# Patient Record
Sex: FEMALE
[De-identification: names, ages, dates, MRNs, and addresses within clinical notes are randomized per-mention and may not be internally consistent; named-entity substitution may affect disease eponyms.]

---

## 2019-08-01 PROBLEM — Z00.00 ENCOUNTER FOR PREVENTIVE HEALTH EXAMINATION: Status: ACTIVE | Noted: 2019-08-01

## 2019-08-12 ENCOUNTER — APPOINTMENT (OUTPATIENT)
Dept: GYNECOLOGIC ONCOLOGY | Facility: CLINIC | Age: 54
End: 2019-08-12
Payer: COMMERCIAL

## 2019-08-12 VITALS
WEIGHT: 131.13 LBS | BODY MASS INDEX: 22.39 KG/M2 | DIASTOLIC BLOOD PRESSURE: 98 MMHG | HEIGHT: 64 IN | SYSTOLIC BLOOD PRESSURE: 138 MMHG

## 2019-08-12 DIAGNOSIS — Z72.89 OTHER PROBLEMS RELATED TO LIFESTYLE: ICD-10-CM

## 2019-08-12 DIAGNOSIS — Z82.3 FAMILY HISTORY OF STROKE: ICD-10-CM

## 2019-08-12 DIAGNOSIS — Z83.3 FAMILY HISTORY OF DIABETES MELLITUS: ICD-10-CM

## 2019-08-12 PROCEDURE — 99244 OFF/OP CNSLTJ NEW/EST MOD 40: CPT

## 2019-08-13 PROBLEM — Z83.3 FAMILY HISTORY OF DIABETES MELLITUS: Status: ACTIVE | Noted: 2019-08-13

## 2019-08-13 PROBLEM — Z72.89 ALCOHOL USE: Status: ACTIVE | Noted: 2019-08-13

## 2019-08-13 PROBLEM — Z82.3 FAMILY HISTORY OF CEREBROVASCULAR ACCIDENT (CVA): Status: ACTIVE | Noted: 2019-08-13

## 2019-08-13 NOTE — PAST MEDICAL HISTORY
[Postmenopausal] : The patient is postmenopausal [Definite ___ (Date)] : the last menstrual period was [unfilled] [Total Preg ___] : G[unfilled] [Live Births ___] : P[unfilled]  [AB Spont ___] : miscarriages: [unfilled]  [FreeTextEntry1] : one abnormal pap, 2005 myomectomy (excessive bleeding), 2009 laparoscopy for endometriosis

## 2019-08-13 NOTE — PHYSICAL EXAM
[Normal] : Recto-Vaginal Exam: Normal [Abnormal] : Uterus: Abnormal [Enlarged ___ wks] : enlarged [unfilled] ~Uweeks [Retroversion] : retroverted [Fully active, able to carry on all pre-disease performance without restriction] : Status 0 - Fully active, able to carry on all pre-disease performance without restriction [de-identified] : healed pfaaliyateil

## 2019-08-13 NOTE — REVIEW OF SYSTEMS
[Negative] : Respiratory [Chest Pain] : chest pain [Muscle Weakness] : muscle weakness [FreeTextEntry5] : veins [de-identified] : pain and pressure [de-identified] : cramps

## 2019-08-13 NOTE — DISCUSSION/SUMMARY
[Reviewed Radiology Report(s)] : Radiology reports were reviewed. [Reviewed Clinical Lab Test(s)] : Results of clinical tests were reviewed. [Discuss Alternatives/Risks/Benefits w/Patient] : All alternatives, risks, and benefits were discussed with the patient/family and all questions were answered.  Patient expressed good understanding and appreciates the importance of follow up as recommended. [Pre Op] : The differential diagnosis was discussed in detail. The indications, risks, benefits and alternatives were discussed. [unfilled] expressed an understanding of the treatment rationale and her questions were answered to her apparent satisfaction. [FreeTextEntry1] : Probable benign, but patient would like surgical resection.\par -she would like both ovaries and tubes removed\par -9/17 lapsy BSO\par -will likely have adhesions\par -MRI disc from [facility] to be uploaded to [facility]  [FreeTextEntry2] : Adnexal mass, likely benign in a patient with probable adhesions

## 2019-08-13 NOTE — HISTORY OF PRESENT ILLNESS
[FreeTextEntry1] : Problem\par 1) Solid adnexal Mass\par 2) Elevated CEA\par    a) 6 7/17/19\par    b) , AFP wnl\par \par Previous Therapy\par 1) Pelvic MRi 6/26/19\par    a) Uterus 7.6x6.8x5.2cm contains numerous degenerating fibroids, stable\par   b) Left adnexal solid mass 2.7x2.5x4.5cm increased from 1cm prior (in 2014)\par \par 54 yo referred by Dr. Bangura for solid L adnexal mass and elevated CEA. Known history of fibroids.

## 2019-09-04 ENCOUNTER — APPOINTMENT (OUTPATIENT)
Dept: GYNECOLOGIC ONCOLOGY | Facility: CLINIC | Age: 54
End: 2019-09-04
Payer: COMMERCIAL

## 2019-09-04 DIAGNOSIS — N83.209 UNSPECIFIED OVARIAN CYST, UNSPECIFIED SIDE: ICD-10-CM

## 2019-09-04 PROCEDURE — 99215 OFFICE O/P EST HI 40 MIN: CPT

## 2019-09-16 PROBLEM — N83.209 CYST OF OVARY: Status: ACTIVE | Noted: 2019-08-12

## 2019-09-16 NOTE — REVIEW OF SYSTEMS
[Negative] : Respiratory [Chest Pain] : chest pain [Muscle Weakness] : muscle weakness [FreeTextEntry5] : veins [de-identified] : pain and pressure [de-identified] : cramps

## 2019-09-16 NOTE — DISCUSSION/SUMMARY
[Reviewed Clinical Lab Test(s)] : Results of clinical tests were reviewed. [Reviewed Radiology Report(s)] : Radiology reports were reviewed. [Discuss Alternatives/Risks/Benefits w/Patient] : All alternatives, risks, and benefits were discussed with the patient/family and all questions were answered.  Patient expressed good understanding and appreciates the importance of follow up as recommended. [Pre Op] : The differential diagnosis was discussed in detail. The indications, risks, benefits and alternatives were discussed. [unfilled] expressed an understanding of the treatment rationale and her questions were answered to her apparent satisfaction. [FreeTextEntry2] : Adnexal mass, likely benign in a patient with probable adhesions [FreeTextEntry1] : Probable benign, but patient would like surgical resection.\par -she would like both ovaries and tubes removed\par -9/17 lapsy BSO\par

## 2019-09-17 ENCOUNTER — APPOINTMENT (OUTPATIENT)
Dept: GYNECOLOGIC ONCOLOGY | Facility: HOSPITAL | Age: 54
End: 2019-09-17

## 2019-09-20 PROBLEM — N83.209 UNSPECIFIED OVARIAN CYST, UNSPECIFIED SIDE: Chronic | Status: ACTIVE | Noted: 2019-09-16

## 2019-11-12 ENCOUNTER — RESULT REVIEW (OUTPATIENT)
Age: 54
End: 2019-11-12

## 2019-11-12 ENCOUNTER — OUTPATIENT (OUTPATIENT)
Dept: OUTPATIENT SERVICES | Facility: HOSPITAL | Age: 54
LOS: 1 days | Discharge: HOME | End: 2019-11-12
Payer: COMMERCIAL

## 2019-11-12 ENCOUNTER — APPOINTMENT (OUTPATIENT)
Dept: GYNECOLOGIC ONCOLOGY | Facility: HOSPITAL | Age: 54
End: 2019-11-12

## 2019-11-12 VITALS — OXYGEN SATURATION: 97 % | HEART RATE: 96 BPM | TEMPERATURE: 98 F | RESPIRATION RATE: 18 BRPM

## 2019-11-12 VITALS
WEIGHT: 129.85 LBS | RESPIRATION RATE: 16 BRPM | HEIGHT: 64 IN | OXYGEN SATURATION: 100 % | TEMPERATURE: 98 F | HEART RATE: 81 BPM | SYSTOLIC BLOOD PRESSURE: 156 MMHG | DIASTOLIC BLOOD PRESSURE: 95 MMHG

## 2019-11-12 DIAGNOSIS — Z41.9 ENCOUNTER FOR PROCEDURE FOR PURPOSES OTHER THAN REMEDYING HEALTH STATE, UNSPECIFIED: Chronic | ICD-10-CM

## 2019-11-12 PROCEDURE — 58661 LAPAROSCOPY REMOVE ADNEXA: CPT

## 2019-11-12 PROCEDURE — 86850 RBC ANTIBODY SCREEN: CPT

## 2019-11-12 PROCEDURE — S2900: CPT

## 2019-11-12 PROCEDURE — 88304 TISSUE EXAM BY PATHOLOGIST: CPT

## 2019-11-12 PROCEDURE — 88341 IMHCHEM/IMCYTCHM EA ADD ANTB: CPT

## 2019-11-12 PROCEDURE — 88112 CYTOPATH CELL ENHANCE TECH: CPT

## 2019-11-12 PROCEDURE — 86900 BLOOD TYPING SEROLOGIC ABO: CPT

## 2019-11-12 PROCEDURE — 88342 IMHCHEM/IMCYTCHM 1ST ANTB: CPT

## 2019-11-12 PROCEDURE — 86901 BLOOD TYPING SEROLOGIC RH(D): CPT

## 2019-11-12 PROCEDURE — 88305 TISSUE EXAM BY PATHOLOGIST: CPT

## 2019-11-12 RX ORDER — KETOROLAC TROMETHAMINE 30 MG/ML
30 SYRINGE (ML) INJECTION ONCE
Refills: 0 | Status: DISCONTINUED | OUTPATIENT
Start: 2019-11-12 | End: 2019-11-12

## 2019-11-12 RX ORDER — OXYCODONE AND ACETAMINOPHEN 5; 325 MG/1; MG/1
2 TABLET ORAL EVERY 6 HOURS
Refills: 0 | Status: DISCONTINUED | OUTPATIENT
Start: 2019-11-12 | End: 2019-11-12

## 2019-11-12 RX ORDER — METOCLOPRAMIDE HCL 10 MG
10 TABLET ORAL ONCE
Refills: 0 | Status: DISCONTINUED | OUTPATIENT
Start: 2019-11-12 | End: 2019-11-12

## 2019-11-12 RX ORDER — ONDANSETRON 8 MG/1
4 TABLET, FILM COATED ORAL EVERY 6 HOURS
Refills: 0 | Status: DISCONTINUED | OUTPATIENT
Start: 2019-11-12 | End: 2019-11-12

## 2019-11-12 RX ORDER — CELECOXIB 200 MG/1
400 CAPSULE ORAL ONCE
Refills: 0 | Status: COMPLETED | OUTPATIENT
Start: 2019-11-12 | End: 2019-11-12

## 2019-11-12 RX ORDER — GABAPENTIN 400 MG/1
600 CAPSULE ORAL ONCE
Refills: 0 | Status: COMPLETED | OUTPATIENT
Start: 2019-11-12 | End: 2019-11-12

## 2019-11-12 RX ORDER — ACETAMINOPHEN 500 MG
1000 TABLET ORAL ONCE
Refills: 0 | Status: COMPLETED | OUTPATIENT
Start: 2019-11-12 | End: 2019-11-12

## 2019-11-12 RX ORDER — HEPARIN SODIUM 5000 [USP'U]/ML
5000 INJECTION INTRAVENOUS; SUBCUTANEOUS ONCE
Refills: 0 | Status: COMPLETED | OUTPATIENT
Start: 2019-11-12 | End: 2019-11-12

## 2019-11-12 RX ORDER — HYDROMORPHONE HYDROCHLORIDE 2 MG/ML
0.5 INJECTION INTRAMUSCULAR; INTRAVENOUS; SUBCUTANEOUS EVERY 4 HOURS
Refills: 0 | Status: DISCONTINUED | OUTPATIENT
Start: 2019-11-12 | End: 2019-11-12

## 2019-11-12 RX ADMIN — HYDROMORPHONE HYDROCHLORIDE 0.5 MILLIGRAM(S): 2 INJECTION INTRAMUSCULAR; INTRAVENOUS; SUBCUTANEOUS at 15:30

## 2019-11-12 RX ADMIN — CELECOXIB 400 MILLIGRAM(S): 200 CAPSULE ORAL at 11:46

## 2019-11-12 RX ADMIN — ONDANSETRON 4 MILLIGRAM(S): 8 TABLET, FILM COATED ORAL at 18:47

## 2019-11-12 RX ADMIN — HEPARIN SODIUM 5000 UNIT(S): 5000 INJECTION INTRAVENOUS; SUBCUTANEOUS at 11:45

## 2019-11-12 RX ADMIN — Medication 1000 MILLIGRAM(S): at 11:46

## 2019-11-12 RX ADMIN — GABAPENTIN 600 MILLIGRAM(S): 400 CAPSULE ORAL at 11:47

## 2019-11-12 RX ADMIN — HYDROMORPHONE HYDROCHLORIDE 0.5 MILLIGRAM(S): 2 INJECTION INTRAMUSCULAR; INTRAVENOUS; SUBCUTANEOUS at 15:28

## 2019-11-12 NOTE — BRIEF OPERATIVE NOTE - OPERATION/FINDINGS
Uterus with adhesions to bowel on posterior surface  Left adnexal mass consistent with suspected fibroma,~4-5x2cm  Bilateral tubes grossly normal  No evidence of intraabdominal pathology

## 2019-11-12 NOTE — PRE-OP CHECKLIST - PATIENT PROBLEMS/NEEDS
Patient expressed no known problems or needs/ucg - Patient expressed no known problems or needs/ucg - negative

## 2019-11-12 NOTE — PROGRESS NOTE ADULT - SUBJECTIVE AND OBJECTIVE BOX
GYN POC    Pt seen and examined at bedside sitting up in chair. Pt complains of feeling tired and mild abdominal pain, controlled with pain meds. Tolerated juice and water without vomiting. C/o mild lightheadedness though ambulated to bathroom without difficulty, voiding. Pt denies any fever, chills, chest pain, SOB.    T(F): 98.7 (11-12-19 @ 14:26), Max: 98.7 (11-12-19 @ 14:26)  HR: 96 (11-12-19 @ 17:41) (78 - 96)  BP: 156/831 (11-12-19 @ 17:11) (127/75 - 156/831)  RR: 9 (11-12-19 @ 17:41) (9 - 28)  SpO2: 97% (11-12-19 @ 17:41) (95% - 100%)  Wt(kg): --    11-12 @ 07:01  -  11-12 @ 19:12  --------------------------------------------------------  IN: 200 mL / OUT: 550 mL / NET: -350 mL        HYDROmorphone  Injectable 0.5 milliGRAM(s) IV Push every 4 hours PRN Severe Pain (7 - 10)  ketorolac   Injectable 30 milliGRAM(s) IV Push once PRN Moderate Pain (4 - 6)  metoclopramide Injectable 10 milliGRAM(s) IV Push once PRN Nausea and/or Vomiting  ondansetron Injectable 4 milliGRAM(s) IV Push every 6 hours PRN Nausea  oxycodone    5 mG/acetaminophen 325 mG 2 Tablet(s) Oral every 6 hours PRN Severe Pain (7 - 10)      Physical exam:  Constitutional: NAD  Pulmonary: clear to auscultation bilaterally  Cardiovascular: regular rate and rhythm  Abdomen: incision sites clean, dry and intact. Soft, mildly tender, nondistended  Extremities: no lower extremity edema, or calf tenderness. SCDs in place

## 2019-11-12 NOTE — PROGRESS NOTE ADULT - ASSESSMENT
A: 55yo s/p robotic assisted bilateral salpingo-oophorectomy for L adnexal mass c/w suspected fibroma POD0. Patient is voiding, ambulating, tolerated clear liquids, and is stable for discharge home. She should follow up in the office for her post-op visit.

## 2019-11-14 PROBLEM — D25.9 LEIOMYOMA OF UTERUS, UNSPECIFIED: Chronic | Status: ACTIVE | Noted: 2019-11-11

## 2019-11-14 PROBLEM — M50.90 CERVICAL DISC DISORDER, UNSPECIFIED, UNSPECIFIED CERVICAL REGION: Chronic | Status: ACTIVE | Noted: 2019-11-11

## 2019-11-14 PROBLEM — I10 ESSENTIAL (PRIMARY) HYPERTENSION: Chronic | Status: ACTIVE | Noted: 2019-11-11

## 2019-11-14 LAB — NON-GYNECOLOGICAL CYTOLOGY STUDY: SIGNIFICANT CHANGE UP

## 2019-11-18 LAB — SURGICAL PATHOLOGY STUDY: SIGNIFICANT CHANGE UP

## 2019-11-21 ENCOUNTER — APPOINTMENT (OUTPATIENT)
Dept: GYNECOLOGIC ONCOLOGY | Facility: CLINIC | Age: 54
End: 2019-11-21
Payer: COMMERCIAL

## 2019-11-21 PROCEDURE — 99024 POSTOP FOLLOW-UP VISIT: CPT

## 2019-11-21 RX ORDER — DOCUSATE SODIUM 100 MG/1
100 CAPSULE ORAL TWICE DAILY
Qty: 60 | Refills: 0 | Status: COMPLETED | COMMUNITY
Start: 2019-11-11 | End: 2019-11-21

## 2019-11-21 RX ORDER — DOCUSATE SODIUM 100 MG/1
100 CAPSULE ORAL TWICE DAILY
Qty: 60 | Refills: 0 | Status: DISCONTINUED | COMMUNITY
Start: 2019-09-16 | End: 2019-11-21

## 2019-11-21 RX ORDER — OXYCODONE AND ACETAMINOPHEN 5; 325 MG/1; MG/1
5-325 TABLET ORAL
Qty: 10 | Refills: 0 | Status: COMPLETED | COMMUNITY
Start: 2019-11-11 | End: 2019-11-21

## 2019-11-21 RX ORDER — IBUPROFEN 600 MG/1
600 TABLET, FILM COATED ORAL EVERY 6 HOURS
Qty: 30 | Refills: 6 | Status: COMPLETED | COMMUNITY
Start: 2019-09-16 | End: 2019-11-21

## 2019-11-21 RX ORDER — OXYCODONE AND ACETAMINOPHEN 5; 325 MG/1; MG/1
5-325 TABLET ORAL
Qty: 15 | Refills: 0 | Status: COMPLETED | COMMUNITY
Start: 2019-09-16 | End: 2019-11-21

## 2019-11-21 NOTE — HISTORY OF PRESENT ILLNESS
[FreeTextEntry1] : Problem\par 1) Solid adnexal Mass\par 2) Elevated CEA\par    a) 6 7/17/19\par    b) , AFP wnl\par \par Previous Therapy\par 1) Pelvic MRi 6/26/19\par    a) Uterus 7.6x6.8x5.2cm contains numerous degenerating fibroids, stable\par   b) Left adnexal solid mass 2.7x2.5x4.5cm increased from 1cm prior (in 2014)\par 2) advanced laparoscopic robotic assisted bilateral salpingo oophorectomy 11/12/19\par    a) 6cm Fibroma of L ovary, 1cm leiomyomata of R ovary, unremarkable fallopian tubes.\par \par Here for 1 week post op follow up. Feeling well. Off narcotics. Using prune juice for constipation.

## 2019-11-21 NOTE — PHYSICAL EXAM
[Normal] : No focal neurologic defects observed [de-identified] : Medardo incisions c/d/i [Fully active, able to carry on all pre-disease performance without restriction] : Status 0 - Fully active, able to carry on all pre-disease performance without restriction

## 2019-11-21 NOTE — DISCUSSION/SUMMARY
[Reviewed Clinical Lab Test(s)] : Results of clinical tests were reviewed. [Reviewed Radiology Report(s)] : Radiology reports were reviewed. [Discuss Alternatives/Risks/Benefits w/Patient] : All alternatives, risks, and benefits were discussed with the patient/family and all questions were answered.  Patient expressed good understanding and appreciates the importance of follow up as recommended. [Pre Op] : The differential diagnosis was discussed in detail. The indications, risks, benefits and alternatives were discussed. [unfilled] expressed an understanding of the treatment rationale and her questions were answered to her apparent satisfaction. [FreeTextEntry2] : Adnexal mass, likely benign in a patient with probable adhesions [FreeTextEntry1] : Excellent post operative recovery\par Benign pathology reviewed in detail\par Follow up in 3 weeks with Dr. Coughlin\par

## 2019-12-09 ENCOUNTER — APPOINTMENT (OUTPATIENT)
Dept: GYNECOLOGIC ONCOLOGY | Facility: CLINIC | Age: 54
End: 2019-12-09
Payer: COMMERCIAL

## 2019-12-16 ENCOUNTER — APPOINTMENT (OUTPATIENT)
Dept: GYNECOLOGIC ONCOLOGY | Facility: CLINIC | Age: 54
End: 2019-12-16
Payer: COMMERCIAL

## 2019-12-16 VITALS
HEIGHT: 64 IN | WEIGHT: 130 LBS | OXYGEN SATURATION: 97 % | SYSTOLIC BLOOD PRESSURE: 130 MMHG | DIASTOLIC BLOOD PRESSURE: 90 MMHG | HEART RATE: 77 BPM | BODY MASS INDEX: 22.2 KG/M2

## 2019-12-16 PROCEDURE — 99213 OFFICE O/P EST LOW 20 MIN: CPT

## 2019-12-17 NOTE — PAST MEDICAL HISTORY
[FreeTextEntry1] : one abnormal pap, 2005 myomectomy (excessive bleeding), 2009 laparoscopy for endometriosis

## 2019-12-17 NOTE — DISCUSSION/SUMMARY
[FreeTextEntry2] : Adnexal mass, likely benign in a patient with probable adhesions [FreeTextEntry1] : Excellent post operative recovery\par Benign pathology reviewed in detail\par Further care with Dr. Bangura\par

## 2019-12-17 NOTE — HISTORY OF PRESENT ILLNESS
[FreeTextEntry1] : Problem\par 1) Solid adnexal Mass\par 2) Elevated CEA\par    a) 6 7/17/19\par    b) , AFP wnl\par \par Previous Therapy\par 1) Pelvic MRi 6/26/19\par    a) Uterus 7.6x6.8x5.2cm contains numerous degenerating fibroids, stable\par   b) Left adnexal solid mass 2.7x2.5x4.5cm increased from 1cm prior (in 2014)\par 2) advanced laparoscopic robotic assisted bilateral salpingo oophorectomy 11/12/19\par    a) 6cm Fibroma of L ovary, 1cm leiomyomata of R ovary, unremarkable fallopian tubes.\par \par Here for 1 month post op visit. Feeling well.

## 2021-01-29 ENCOUNTER — TRANSCRIPTION ENCOUNTER (OUTPATIENT)
Age: 56
End: 2021-01-29

## 2022-03-14 ENCOUNTER — TRANSCRIPTION ENCOUNTER (OUTPATIENT)
Age: 57
End: 2022-03-14

## 2022-03-29 NOTE — PHYSICAL EXAM
[Abnormal] : Uterus: Abnormal [Retroversion] : retroverted [Enlarged ___ wks] : enlarged [unfilled] ~Uweeks [Normal] : Anus and perineum: Normal sphincter tone, no masses, no prolapse. [Fully active, able to carry on all pre-disease performance without restriction] : Status 0 - Fully active, able to carry on all pre-disease performance without restriction [de-identified] : healed pfaaliyateil 98.2

## 2024-03-04 DIAGNOSIS — Z86.79 PERSONAL HISTORY OF OTHER DISEASES OF THE CIRCULATORY SYSTEM: ICD-10-CM

## 2024-03-04 DIAGNOSIS — Z87.39 PERSONAL HISTORY OF OTHER DISEASES OF THE MUSCULOSKELETAL SYSTEM AND CONNECTIVE TISSUE: ICD-10-CM

## 2024-03-04 DIAGNOSIS — Z86.39 PERSONAL HISTORY OF OTHER ENDOCRINE, NUTRITIONAL AND METABOLIC DISEASE: ICD-10-CM

## 2024-03-04 RX ORDER — IBUPROFEN 800 MG/1
800 TABLET, FILM COATED ORAL 3 TIMES DAILY
Qty: 45 | Refills: 0 | Status: DISCONTINUED | COMMUNITY
Start: 2019-11-11 | End: 2024-03-04

## 2024-03-04 RX ORDER — AMLODIPINE BESYLATE 5 MG/1
5 TABLET ORAL
Refills: 0 | Status: ACTIVE | COMMUNITY

## 2024-03-05 ENCOUNTER — NON-APPOINTMENT (OUTPATIENT)
Age: 59
End: 2024-03-05

## 2024-03-05 ENCOUNTER — APPOINTMENT (OUTPATIENT)
Dept: NEUROLOGY | Facility: CLINIC | Age: 59
End: 2024-03-05
Payer: COMMERCIAL

## 2024-03-05 VITALS
BODY MASS INDEX: 21.34 KG/M2 | HEART RATE: 86 BPM | OXYGEN SATURATION: 98 % | WEIGHT: 125 LBS | SYSTOLIC BLOOD PRESSURE: 146 MMHG | HEIGHT: 64 IN | DIASTOLIC BLOOD PRESSURE: 94 MMHG

## 2024-03-05 DIAGNOSIS — Z86.73 PERSONAL HISTORY OF TRANSIENT ISCHEMIC ATTACK (TIA), AND CEREBRAL INFARCTION W/OUT RESIDUAL DEFICITS: ICD-10-CM

## 2024-03-05 DIAGNOSIS — R29.898 OTHER SYMPTOMS AND SIGNS INVOLVING THE MUSCULOSKELETAL SYSTEM: ICD-10-CM

## 2024-03-05 DIAGNOSIS — E78.5 HYPERLIPIDEMIA, UNSPECIFIED: ICD-10-CM

## 2024-03-05 DIAGNOSIS — I10 ESSENTIAL (PRIMARY) HYPERTENSION: ICD-10-CM

## 2024-03-05 DIAGNOSIS — D57.3 SICKLE-CELL TRAIT: ICD-10-CM

## 2024-03-05 PROCEDURE — G2211 COMPLEX E/M VISIT ADD ON: CPT

## 2024-03-05 PROCEDURE — 99205 OFFICE O/P NEW HI 60 MIN: CPT

## 2024-03-05 NOTE — REASON FOR VISIT
[Consultation] : a consultation visit [FreeTextEntry1] : pt state she was at [facility] spot on brain.

## 2024-03-05 NOTE — ASSESSMENT
[FreeTextEntry1] : Ms. JASSI BRYANT is a 58 year old female here today for neurology consultation after presenting to a hospital with TIA symptoms .  MRI brain showed no acute stroke but there is evidence for small vessel chronic ischemic disease  CTA head and neck showed no large vessel atherosclerosis  Would recommend the following for secondary stroke prevention   PLAN OF CARE - start aspirin 81 mg per day monitor BP at home to assure it is < 130/80  check lipid panel and if LDL > 100 start high intensity statin  check HbA1C cardiology referral for heart monitor  counseled patient about mediterranean diet continue aerobic exercise 3-4 tmes per week.      Neurology follow-up- 2 months

## 2024-03-05 NOTE — HISTORY OF PRESENT ILLNESS
[FreeTextEntry1] : Ms. JASSI BRYANT is a 58 year old female here today for neurology consultation for Hx HTN, preDM, HLD and sickle cell trait. Takes norvasc 5mg daily; was told by pcp to increase to 10mg. Pt is not taking asa daily.   Gisel Denton NP collaborated with the neurologist during today's visit.  Pt is here today following ED visit to [facility] 1/22/2024 after she noted difficulty ambulating. She recalls standing up to walk and the first few 10-15 steps she had no difficulty but then developed unsteady gait. She recalls she had poor control of her RLE and she had bad headache 3 days before going to ED. No hx migraine headaches. She takes Norvasc for hypertension but the dose was reduced 2 years prior . She does not take any statin treatment even though cholesterol noted to be mildy elevated. 1/23/2024 MRI Brain sequela prior small vessel dz, no acute change. Low back pain -> MRI L/S no stenosis. CTA Head and Neck no stenosis. 1/22/2024 Ct neuro perfusion no acute infarct. 1/22/2024 Ct Head no acute change. Right leg weakness resolved on arrival to the ED visit. Prolonged sitting results in increased RLE weakness. Hx PT for LBP completed 3 years ago. BP today 146/94. Plans to start norvasc 10mg daily starting today. Home usually approx /90. PCP Dr Healy managing htn.   FHx CVA brother(age late 30's), DM mother and grandmother, HTN mother, uncle, brother. No family hx of dementia.   Diet - mostly chicken, pork, fish baked with , veg and fruit,   Exercise - treadmill 3-4 days weekly 20-30mins.

## 2024-03-05 NOTE — PHYSICAL EXAM
[FreeTextEntry1] : Mental Status Awake, alert and oriented to person, place, time and situation. Provides detailed history. Spells WORLD backwards. Serial 7"s subtraction intact NO apraxia No agnosia Can name objects, speak fluently and follow all verbal requests  no neglect registers 3 objects but only recalled 1/3 after 5 min   MMSE 28/30   Cranial Nerves II: VFF III, IV, VI: PERRL, EOMI. V: Facial sensation is normal B/L. VII: Facial strength is symmetric. VIII: wnl IX, X: Palate is midline and elevates symmetrically. XI: Trapezius normal strength. XII: Tongue midline without atrophy or fasciculations.  MOTOR EXAM Muscle tone - no evidence of rigidity or resistance in all 4 extremities. No atrophy or fasciculations. Muscle Strength: arms and legs, proximal and distal flexors and extensors are normal. No UE drift.   REFLEXES All present, diminished symmetrical 1+  Right Plantar: mute. Left Plantar: mute.   COORDINATION Finger to nose: Normal. Heel to shin: Normal.   SENSORY Vibration: intact Sensation: light touch, pin prick distribution intact Temperature: intact   GAIT Normal. steady normal base. Intact heel and toe walking  Tandem walk normal. Romberg negative.

## 2024-05-22 ENCOUNTER — APPOINTMENT (OUTPATIENT)
Dept: NEUROLOGY | Facility: CLINIC | Age: 59
End: 2024-05-22

## 2024-06-28 ENCOUNTER — APPOINTMENT (OUTPATIENT)
Dept: CARDIOLOGY | Facility: CLINIC | Age: 59
End: 2024-06-28

## 2024-10-11 ENCOUNTER — NON-APPOINTMENT (OUTPATIENT)
Age: 59
End: 2024-10-11

## 2024-10-11 ENCOUNTER — APPOINTMENT (OUTPATIENT)
Dept: CARDIOLOGY | Facility: CLINIC | Age: 59
End: 2024-10-11

## 2024-10-11 VITALS — SYSTOLIC BLOOD PRESSURE: 133 MMHG | DIASTOLIC BLOOD PRESSURE: 77 MMHG

## 2024-10-11 VITALS
OXYGEN SATURATION: 100 % | SYSTOLIC BLOOD PRESSURE: 128 MMHG | WEIGHT: 128 LBS | BODY MASS INDEX: 21.97 KG/M2 | DIASTOLIC BLOOD PRESSURE: 79 MMHG | HEART RATE: 83 BPM

## 2024-10-11 DIAGNOSIS — I10 ESSENTIAL (PRIMARY) HYPERTENSION: ICD-10-CM

## 2024-10-11 DIAGNOSIS — E78.5 HYPERLIPIDEMIA, UNSPECIFIED: ICD-10-CM

## 2024-10-11 DIAGNOSIS — Z86.018 PERSONAL HISTORY OF OTHER BENIGN NEOPLASM: ICD-10-CM

## 2024-10-11 DIAGNOSIS — Z86.73 PERSONAL HISTORY OF TRANSIENT ISCHEMIC ATTACK (TIA), AND CEREBRAL INFARCTION W/OUT RESIDUAL DEFICITS: ICD-10-CM

## 2024-10-11 DIAGNOSIS — Z78.9 OTHER SPECIFIED HEALTH STATUS: ICD-10-CM

## 2024-10-11 PROCEDURE — 93000 ELECTROCARDIOGRAM COMPLETE: CPT | Mod: 59

## 2024-10-11 PROCEDURE — 99204 OFFICE O/P NEW MOD 45 MIN: CPT

## 2024-10-11 PROCEDURE — 93246 EXT ECG>7D<15D RECORDING: CPT

## 2024-10-11 RX ORDER — FEXOFENADINE HCL 60 MG
CAPSULE ORAL
Refills: 0 | Status: ACTIVE | COMMUNITY

## 2024-10-11 RX ORDER — MULTIVIT-MIN/IRON/FOLIC ACID/K 18-600-40
CAPSULE ORAL
Refills: 0 | Status: ACTIVE | COMMUNITY

## 2024-10-11 RX ORDER — BACILLUS COAGULANS/INULIN 1B-250 MG
CAPSULE ORAL
Refills: 0 | Status: ACTIVE | COMMUNITY

## 2024-10-11 RX ORDER — ASCORBIC ACID 1000 MG
TABLET ORAL
Refills: 0 | Status: ACTIVE | COMMUNITY

## 2024-10-11 RX ORDER — UBIDECARENONE/VIT E ACET 100MG-5
CAPSULE ORAL
Refills: 0 | Status: ACTIVE | COMMUNITY

## 2024-10-11 RX ORDER — MULTIVITAMIN
TABLET ORAL
Refills: 0 | Status: ACTIVE | COMMUNITY

## 2024-10-24 ENCOUNTER — NON-APPOINTMENT (OUTPATIENT)
Age: 59
End: 2024-10-24

## 2024-11-14 ENCOUNTER — APPOINTMENT (OUTPATIENT)
Dept: CARDIOLOGY | Facility: CLINIC | Age: 59
End: 2024-11-14
Payer: COMMERCIAL

## 2024-11-14 VITALS
HEART RATE: 86 BPM | DIASTOLIC BLOOD PRESSURE: 83 MMHG | SYSTOLIC BLOOD PRESSURE: 144 MMHG | OXYGEN SATURATION: 100 % | WEIGHT: 128 LBS | BODY MASS INDEX: 21.85 KG/M2 | HEIGHT: 64 IN

## 2024-11-14 VITALS — SYSTOLIC BLOOD PRESSURE: 136 MMHG | DIASTOLIC BLOOD PRESSURE: 80 MMHG

## 2024-11-14 DIAGNOSIS — I10 ESSENTIAL (PRIMARY) HYPERTENSION: ICD-10-CM

## 2024-11-14 DIAGNOSIS — E78.5 HYPERLIPIDEMIA, UNSPECIFIED: ICD-10-CM

## 2024-11-14 DIAGNOSIS — Z98.890 OTHER SPECIFIED POSTPROCEDURAL STATES: ICD-10-CM

## 2024-11-14 DIAGNOSIS — Z86.73 PERSONAL HISTORY OF TRANSIENT ISCHEMIC ATTACK (TIA), AND CEREBRAL INFARCTION W/OUT RESIDUAL DEFICITS: ICD-10-CM

## 2024-11-14 PROCEDURE — 93248 EXT ECG>7D<15D REV&INTERPJ: CPT

## 2024-11-14 PROCEDURE — 99214 OFFICE O/P EST MOD 30 MIN: CPT

## 2025-06-20 ENCOUNTER — OFFICE (OUTPATIENT)
Dept: URBAN - METROPOLITAN AREA CLINIC 29 | Facility: CLINIC | Age: 60
Setting detail: OPHTHALMOLOGY
End: 2025-06-20
Payer: COMMERCIAL

## 2025-06-20 DIAGNOSIS — H11.151: ICD-10-CM

## 2025-06-20 DIAGNOSIS — H25.13: ICD-10-CM

## 2025-06-20 DIAGNOSIS — H16.223: ICD-10-CM

## 2025-06-20 DIAGNOSIS — H43.813: ICD-10-CM

## 2025-06-20 PROCEDURE — 92004 COMPRE OPH EXAM NEW PT 1/>: CPT | Performed by: OPHTHALMOLOGY

## 2025-06-20 ASSESSMENT — REFRACTION_AUTOREFRACTION
OS_CYLINDER: +1.00
OS_SPHERE: -0.25
OD_AXIS: 173
OD_SPHERE: -1.25
OD_CYLINDER: +1.25
OS_AXIS: 7

## 2025-06-20 ASSESSMENT — REFRACTION_CURRENTRX
OD_AXIS: 156
OD_CYLINDER: +0.50
OS_SPHERE: -1.75
OS_OVR_VA: 20/
OS_AXIS: 3
OD_OVR_VA: 20/
OD_SPHERE: -1.75
OS_CYLINDER: +0.75

## 2025-06-20 ASSESSMENT — TONOMETRY
OD_IOP_MMHG: 17
OS_IOP_MMHG: 17

## 2025-06-20 ASSESSMENT — VISUAL ACUITY
OS_BCVA: 20/25-2
OD_BCVA: 20/25

## 2025-06-20 ASSESSMENT — CONFRONTATIONAL VISUAL FIELD TEST (CVF)
OD_FINDINGS: FULL
OS_FINDINGS: FULL

## 2025-06-20 ASSESSMENT — TEAR BREAK UP TIME (TBUT)
OD_TBUT: 2+
OS_TBUT: 2+